# Patient Record
Sex: MALE | Race: BLACK OR AFRICAN AMERICAN | Employment: FULL TIME | URBAN - METROPOLITAN AREA
[De-identification: names, ages, dates, MRNs, and addresses within clinical notes are randomized per-mention and may not be internally consistent; named-entity substitution may affect disease eponyms.]

---

## 2019-05-26 ENCOUNTER — HOSPITAL ENCOUNTER (EMERGENCY)
Age: 34
Discharge: HOME | End: 2019-05-27
Attending: EMERGENCY MEDICINE | Admitting: EMERGENCY MEDICINE
Payer: SELF-PAY

## 2019-05-26 VITALS
HEART RATE: 87 BPM | WEIGHT: 315 LBS | DIASTOLIC BLOOD PRESSURE: 91 MMHG | HEIGHT: 71 IN | OXYGEN SATURATION: 99 % | SYSTOLIC BLOOD PRESSURE: 138 MMHG | RESPIRATION RATE: 19 BRPM | TEMPERATURE: 98.8 F | BODY MASS INDEX: 44.1 KG/M2

## 2019-05-26 DIAGNOSIS — G89.29 CHRONIC PAIN OF RIGHT ANKLE: Primary | ICD-10-CM

## 2019-05-26 DIAGNOSIS — M25.571 CHRONIC PAIN OF RIGHT ANKLE: Primary | ICD-10-CM

## 2019-05-26 PROCEDURE — 99282 EMERGENCY DEPT VISIT SF MDM: CPT

## 2019-05-27 ENCOUNTER — APPOINTMENT (OUTPATIENT)
Dept: GENERAL RADIOLOGY | Age: 34
End: 2019-05-27
Attending: EMERGENCY MEDICINE
Payer: SELF-PAY

## 2019-05-27 PROCEDURE — 73610 X-RAY EXAM OF ANKLE: CPT

## 2019-05-27 NOTE — DISCHARGE INSTRUCTIONS

## 2019-05-27 NOTE — ED PROVIDER NOTES
30 yo AAM with no relevant PMHx presents with several months intermittent right ankle pain. Pt states that a few days ago he started having another episode of right ankle pain and swelling after work. Pt states that pain tends to be worse with walking and sometimes sends very sharp pain up leg, which causes his legs to buckle. Pt states that pain is better now than it has been in a few days. No injury, no fevers, no other symptoms or complaints. History reviewed. No pertinent past medical history. History reviewed. No pertinent surgical history. History reviewed. No pertinent family history.     Social History     Socioeconomic History    Marital status: SINGLE     Spouse name: Not on file    Number of children: Not on file    Years of education: Not on file    Highest education level: Not on file   Occupational History    Not on file   Social Needs    Financial resource strain: Not on file    Food insecurity:     Worry: Not on file     Inability: Not on file    Transportation needs:     Medical: Not on file     Non-medical: Not on file   Tobacco Use    Smoking status: Never Smoker    Smokeless tobacco: Never Used   Substance and Sexual Activity    Alcohol use: Never     Frequency: Never    Drug use: Never    Sexual activity: Not on file   Lifestyle    Physical activity:     Days per week: Not on file     Minutes per session: Not on file    Stress: Not on file   Relationships    Social connections:     Talks on phone: Not on file     Gets together: Not on file     Attends Yazdanism service: Not on file     Active member of club or organization: Not on file     Attends meetings of clubs or organizations: Not on file     Relationship status: Not on file    Intimate partner violence:     Fear of current or ex partner: Not on file     Emotionally abused: Not on file     Physically abused: Not on file     Forced sexual activity: Not on file   Other Topics Concern    Not on file Social History Narrative    Not on file         ALLERGIES: Patient has no known allergies. Review of Systems   Constitutional: Negative for fever. HENT: Negative for trouble swallowing. Respiratory: Negative for shortness of breath. Cardiovascular: Negative for chest pain. Gastrointestinal: Negative for abdominal pain and vomiting. Genitourinary: Negative for difficulty urinating. Musculoskeletal: Positive for arthralgias. Skin: Negative for wound. Neurological: Negative for syncope. Psychiatric/Behavioral: Negative for behavioral problems. All other systems reviewed and are negative. Vitals:    05/26/19 2348   BP: (!) 138/91   Pulse: 87   Resp: 19   Temp: 98.8 °F (37.1 °C)   SpO2: 99%   Weight: 147.4 kg (325 lb)   Height: 5' 11\" (1.803 m)            Physical Exam   Constitutional: He is oriented to person, place, and time. He appears well-developed. No distress. well-appearing, nad   HENT:   Head: Normocephalic and atraumatic. Eyes: EOM are normal.   Neck: Normal range of motion. Cardiovascular: Normal rate and intact distal pulses. Pulmonary/Chest: Effort normal and breath sounds normal. No respiratory distress. Abdominal: Soft. There is no tenderness. Musculoskeletal: Normal range of motion. He exhibits no edema or tenderness. No bony tenderness, mechanically stable, pt ambulates with slight limp   Neurological: He is alert and oriented to person, place, and time. No focal deficits noted   Skin: Skin is warm. Psychiatric: His behavior is normal.   Nursing note and vitals reviewed. MDM  Number of Diagnoses or Management Options  Chronic pain of right ankle:   Diagnosis management comments: 30 yo AAM with no relevant PMHx presents with several months intermittent right ankle pain. No injury, no fevers, no other symptoms or complaints. Examination unremarkable with no bony tenderness, mechanically stable, ambulates without much difficulty in ED.   Xray no acute process to myr read. I do not suspect emergent medical condition clinically. Dc home, symptom management, follow-up, return precautions. Amount and/or Complexity of Data Reviewed  Tests in the radiology section of CPT®: ordered and reviewed  Review and summarize past medical records: yes  Independent visualization of images, tracings, or specimens: yes    Patient Progress  Patient progress: stable         Procedures    PROGRESS NOTES    3:36 AM:   Kerri Naranjo MD arrives to the bedside to evaluate the patient. Answered the patient's questions regarding the treatment plan. CONSULTATIONS  None      MEDICATIONS ORDERED  Medications - No data to display    RADIOLOGY INTERPRETATIONS  XR ANKLE RT MIN 3 V    (Results Pending)       EKG READINGS/LABORATORY RESULTS  No results found for this or any previous visit (from the past 12 hour(s)). ED DIAGNOSIS & DISPOSITION INFORMATION  Diagnosis:   1.  Chronic pain of right ankle        Disposition: Discharged    Follow-up Information     Follow up With Specialties Details Why [id number] [facility]  Schedule an appointment as soon as possible for a visit  [address] [facility]  [address] [address] [address] ([facility]) [address] EMERGENCY DEPT Emergency Medicine  As needed [address]  [phone]          Patient's Medications    No medications on file           Kerri Naranjo MD.

## 2019-05-27 NOTE — ED TRIAGE NOTES
Pt ambulatory into triage for c/o right ankle pain x5 months, denies any injury. Pt states \"I was out of work for a while and when I went back to work my ankle started bothering me again\". PMSx3 in right foot. No swelling noted.

## 2019-05-27 NOTE — ED NOTES
3:53 AM  05/27/19     Discharge instructions given to patient (name) with verbalization of understanding. Patient accompanied by self. Patient discharged with the following prescriptions none. Patient discharged to home (destination).       Jeff Agrawal RN

## 2020-10-26 ENCOUNTER — OFFICE VISIT (OUTPATIENT)
Dept: FAMILY MEDICINE CLINIC | Facility: CLINIC | Age: 35
End: 2020-10-26

## 2020-10-26 VITALS
RESPIRATION RATE: 16 BRPM | SYSTOLIC BLOOD PRESSURE: 153 MMHG | HEART RATE: 88 BPM | WEIGHT: 315 LBS | BODY MASS INDEX: 42.66 KG/M2 | HEIGHT: 72 IN | OXYGEN SATURATION: 96 % | TEMPERATURE: 97 F | DIASTOLIC BLOOD PRESSURE: 96 MMHG

## 2020-10-26 DIAGNOSIS — E66.01 OBESITY, MORBID (HCC): ICD-10-CM

## 2020-10-26 DIAGNOSIS — I10 ESSENTIAL HYPERTENSION: ICD-10-CM

## 2020-10-26 DIAGNOSIS — R20.0 NUMBNESS: Primary | ICD-10-CM

## 2020-10-26 DIAGNOSIS — K62.5 BRBPR (BRIGHT RED BLOOD PER RECTUM): ICD-10-CM

## 2020-10-26 PROCEDURE — 99202 OFFICE O/P NEW SF 15 MIN: CPT | Performed by: FAMILY MEDICINE

## 2020-10-26 RX ORDER — AMLODIPINE BESYLATE 5 MG/1
5 TABLET ORAL DAILY
COMMUNITY
Start: 2020-07-31 | End: 2020-10-26 | Stop reason: SDUPTHER

## 2020-10-26 RX ORDER — AMLODIPINE BESYLATE 5 MG/1
5 TABLET ORAL DAILY
Qty: 30 TAB | Refills: 5 | Status: SHIPPED | OUTPATIENT
Start: 2020-10-26 | End: 2021-10-19

## 2020-10-26 NOTE — PROGRESS NOTES
HPI  Saritha Woods is a 36 y.o. male being seen today for   Chief Complaint   Patient presents with    Medication Refill   IOV for this pt to care a Dam[facility] Wynn.  he states that he has high blood pressure and is out of bp med. He was diagnosed in the ED a few months ago and was on amlodipine breifly. No side effect from this med but he ran out. A few weeks ago he had brbpr. Resolved on its own. No pain. He lucio report episodic numbness to various parts of his body. Some times his arm or leg, sometimes his head. He cannot tell if it is due to positioning. Not getting worse. Was not better or worse when he was on amlodipine. No past medical history on file. ROS  Patient states that he is feeling well. Denies complaints of chest pain, shortness of breath, swelling of legs, dizziness or weakness. he denies nausea, vomiting or diarrhea. Current Outpatient Medications   Medication Sig    amLODIPine (NORVASC) 5 mg tablet Take 1 Tab by mouth daily. No current facility-administered medications for this visit. PE  Visit Vitals  BP (!) 153/96 (BP 1 Location: Left arm, BP Patient Position: Sitting)   Pulse 88   Temp 97 °F (36.1 °C) (Temporal)   Resp 16   Ht 6' (1.829 m)   Wt 315 lb (142.9 kg)   SpO2 96%   BMI 42.72 kg/m²        Alert and oriented with normal mood and affect. he is well developed and well nourished . Lungs are clear without wheezing. Heart rate is regular without murmurs or gallops. There is no lower extremity edema. Peripheral pulses are full. Assessment and Plan:        ICD-10-CM ICD-9-CM    1. Numbness   Unclear cause. Does not seem c/w circulatory issue or pinched nerve. If persists consider neuro referral R20.0 782.0    2. Obesity, morbid ([facility] [address]   reveiwed wt loss plan. Pt would like to lose over 50#. Recheck in a few months E66.01 278.01    3. Essential hypertension   Restart amlodipine  Follow up in a few months I10 401.9    4.  BRBPR (bright red blood per rectum)  Monitor sx and recheck next visit.   K62.5 569.3      Restart amlodpine  revieweed wt loss plan  Follow up 2 mos      Saida Keith MD

## 2020-10-27 PROBLEM — I10 ESSENTIAL HYPERTENSION: Status: ACTIVE | Noted: 2020-10-27

## 2021-10-19 RX ORDER — AMLODIPINE BESYLATE 5 MG/1
TABLET ORAL
Qty: 30 TABLET | Refills: 0 | Status: SHIPPED | OUTPATIENT
Start: 2021-10-19 | End: 2022-01-24 | Stop reason: DRUGHIGH

## 2022-01-24 ENCOUNTER — OFFICE VISIT (OUTPATIENT)
Dept: FAMILY MEDICINE CLINIC | Facility: CLINIC | Age: 37
End: 2022-01-24

## 2022-01-24 VITALS
BODY MASS INDEX: 42.72 KG/M2 | HEART RATE: 84 BPM | SYSTOLIC BLOOD PRESSURE: 151 MMHG | RESPIRATION RATE: 18 BRPM | OXYGEN SATURATION: 92 % | TEMPERATURE: 96.2 F | DIASTOLIC BLOOD PRESSURE: 87 MMHG | HEIGHT: 72 IN

## 2022-01-24 DIAGNOSIS — F17.200 SMOKER: ICD-10-CM

## 2022-01-24 DIAGNOSIS — I10 ESSENTIAL HYPERTENSION: Primary | ICD-10-CM

## 2022-01-24 PROCEDURE — 99442 PR PHYS/QHP TELEPHONE EVALUATION 11-20 MIN: CPT | Performed by: FAMILY MEDICINE

## 2022-01-24 RX ORDER — AMLODIPINE BESYLATE 10 MG/1
10 TABLET ORAL DAILY
Qty: 90 TABLET | Refills: 1 | Status: SHIPPED | OUTPATIENT
Start: 2022-01-24 | End: 2022-05-19 | Stop reason: SDUPTHER

## 2022-01-24 RX ORDER — BUPROPION HYDROCHLORIDE 150 MG/1
150 TABLET, EXTENDED RELEASE ORAL 2 TIMES DAILY
Qty: 60 TABLET | Refills: 2 | Status: SHIPPED | OUTPATIENT
Start: 2022-01-24

## 2022-01-24 NOTE — PROGRESS NOTES
Patient not seen  In person due to positive COVID test on this past Saturday 1/22/22 . Telemedicine visit done instead. Will mail AVS to patient.          AVS mailed to patient

## 2022-01-26 NOTE — PROGRESS NOTES
HPI  Bryan Porter is a 29 y.o. male being seen today for   Chief Complaint   Patient presents with    Hypertension   follow up for this pt with hypertension and smoking. he states that he was recently dx with covid 19 but is feeling much better. He is still within his quarantine period so that after his vitals were taken, he actually went back to his car and remainder of visit was done via telephone due to covid precautions. He does check his bp at home. He is out of bp meds a few days. Before he ran out his bp was 140s and 150s. He is interested in increasing his dose of amlodpine    He was still smoking until he got covid. Now he has not smoked for a week. He is interested in quitting for good and feels this time is as good as any. Wanting to know about quit aids. Past Medical History:   Diagnosis Date    Hypertension          ROS  Patient states that he is feeling well. Denies complaints of chest pain, shortness of breath, swelling of legs, dizziness or weakness. he denies nausea, vomiting or diarrhea. Current Outpatient Medications   Medication Sig    amLODIPine (NORVASC) 10 mg tablet Take 1 Tablet by mouth daily.  buPROPion SR (WELLBUTRIN SR) 150 mg SR tablet Take 1 Tablet by mouth two (2) times a day. No current facility-administered medications for this visit. PE  Visit Vitals  BP (!) 151/87 (BP 1 Location: Left arm, BP Patient Position: Sitting, BP Cuff Size: Large adult long)   Pulse 84   Temp (!) 96.2 °F (35.7 °C) (Temporal)   Resp 18   Ht 6' (1.829 m)   SpO2 92%   BMI 42.72 kg/m²        Alert and oriented with normal mood and affect. Assessment and Plan:        ICD-10-CM ICD-9-CM    1. Essential hypertension  I10 401.9    2. Smoker  F17.200 305.1      Increase amlodipine to 10mg daily. Refill sent  Reviewed the available quit aids. Chantix, nicotine patches, wellbutrin. He would like to start wellbutrin. Reviewed quit plan.    Follow up in a month or so.      Piter Bergeron MD    Telephone call 15 minutes    Pursuant to the emergency declaration under the [facility] waiver authority and the [employer] Act, this Virtual  Visit was conducted, with patient's consent, to reduce the patient's risk of exposure to COVID-19 and provide continuity of care for an established patient.

## 2022-02-28 ENCOUNTER — TELEPHONE (OUTPATIENT)
Dept: FAMILY MEDICINE CLINIC | Facility: CLINIC | Age: 37
End: 2022-02-28

## 2022-02-28 NOTE — TELEPHONE ENCOUNTER
Attempted to reach out to patient on his mobile phone. Left patient a voicemail to give the office a call back.

## 2022-02-28 NOTE — TELEPHONE ENCOUNTER
----- Message from Trey Catherine MD sent at 2/27/2022 12:42 PM EST -----  Regarding: follow up  Can you see if this nice pt will see piotr on Wednesday?    For bp follow up  Im trying to get some patients scheduled for her    Thx  jenna

## 2022-03-18 PROBLEM — I10 ESSENTIAL HYPERTENSION: Status: ACTIVE | Noted: 2020-10-27

## 2022-03-19 PROBLEM — E66.01 OBESITY, MORBID (HCC): Status: ACTIVE | Noted: 2020-10-26

## 2022-05-19 RX ORDER — AMLODIPINE BESYLATE 10 MG/1
10 TABLET ORAL DAILY
Qty: 90 TABLET | Refills: 1 | Status: SHIPPED | OUTPATIENT
Start: 2022-05-19

## 2022-05-23 ENCOUNTER — HOSPITAL ENCOUNTER (OUTPATIENT)
Dept: LAB | Age: 37
Discharge: HOME | End: 2022-05-23

## 2022-05-23 ENCOUNTER — OFFICE VISIT (OUTPATIENT)
Dept: FAMILY MEDICINE CLINIC | Facility: CLINIC | Age: 37
End: 2022-05-23

## 2022-05-23 VITALS
SYSTOLIC BLOOD PRESSURE: 138 MMHG | HEIGHT: 72 IN | DIASTOLIC BLOOD PRESSURE: 85 MMHG | BODY MASS INDEX: 42.66 KG/M2 | OXYGEN SATURATION: 98 % | TEMPERATURE: 97.3 F | WEIGHT: 315 LBS | RESPIRATION RATE: 18 BRPM | HEART RATE: 65 BPM

## 2022-05-23 DIAGNOSIS — I10 ESSENTIAL HYPERTENSION: ICD-10-CM

## 2022-05-23 DIAGNOSIS — Z00.00 ROUTINE GENERAL MEDICAL EXAMINATION AT A HEALTH CARE FACILITY: ICD-10-CM

## 2022-05-23 DIAGNOSIS — F17.200 SMOKER: ICD-10-CM

## 2022-05-23 DIAGNOSIS — Z00.00 ROUTINE GENERAL MEDICAL EXAMINATION AT A HEALTH CARE FACILITY: Primary | ICD-10-CM

## 2022-05-23 LAB
ALBUMIN SERPL-MCNC: 3.8 G/DL (ref 3.4–5)
ALBUMIN/GLOB SERPL: 1.1 {RATIO} (ref 0.8–1.7)
ALP SERPL-CCNC: 51 U/L (ref 45–117)
ALT SERPL-CCNC: 20 U/L (ref 16–61)
ANION GAP SERPL CALC-SCNC: 4 MMOL/L (ref 3–18)
AST SERPL-CCNC: 18 U/L (ref 10–38)
BASOPHILS # BLD: 0 K/UL (ref 0–0.1)
BASOPHILS NFR BLD: 1 % (ref 0–2)
BILIRUB SERPL-MCNC: 0.4 MG/DL (ref 0.2–1)
BUN SERPL-MCNC: 10 MG/DL (ref 7–18)
BUN/CREAT SERPL: 9 (ref 12–20)
CALCIUM SERPL-MCNC: 9.3 MG/DL (ref 8.5–10.1)
CHLORIDE SERPL-SCNC: 107 MMOL/L (ref 100–111)
CHOLEST SERPL-MCNC: 188 MG/DL
CO2 SERPL-SCNC: 28 MMOL/L (ref 21–32)
CREAT SERPL-MCNC: 1.09 MG/DL (ref 0.6–1.3)
DIFFERENTIAL METHOD BLD: ABNORMAL
EOSINOPHIL # BLD: 0.2 K/UL (ref 0–0.4)
EOSINOPHIL NFR BLD: 2 % (ref 0–5)
ERYTHROCYTE [DISTWIDTH] IN BLOOD BY AUTOMATED COUNT: 12 % (ref 11.6–14.5)
EST. AVERAGE GLUCOSE BLD GHB EST-MCNC: 100 MG/DL
GLOBULIN SER CALC-MCNC: 3.6 G/DL (ref 2–4)
GLUCOSE SERPL-MCNC: 97 MG/DL (ref 74–99)
HBA1C MFR BLD: 5.1 % (ref 4.2–5.6)
HCT VFR BLD AUTO: 39.7 % (ref 36–48)
HDLC SERPL-MCNC: 44 MG/DL (ref 40–60)
HDLC SERPL: 4.3 {RATIO} (ref 0–5)
HGB BLD-MCNC: 13 G/DL (ref 13–16)
IMM GRANULOCYTES # BLD AUTO: 0.1 K/UL (ref 0–0.04)
IMM GRANULOCYTES NFR BLD AUTO: 1 % (ref 0–0.5)
LDLC SERPL CALC-MCNC: 126.8 MG/DL (ref 0–100)
LIPID PROFILE,FLP: ABNORMAL
LYMPHOCYTES # BLD: 2.6 K/UL (ref 0.9–3.6)
LYMPHOCYTES NFR BLD: 39 % (ref 21–52)
MCH RBC QN AUTO: 28.4 PG (ref 24–34)
MCHC RBC AUTO-ENTMCNC: 32.7 G/DL (ref 31–37)
MCV RBC AUTO: 86.7 FL (ref 78–100)
MONOCYTES # BLD: 0.5 K/UL (ref 0.05–1.2)
MONOCYTES NFR BLD: 7 % (ref 3–10)
NEUTS SEG # BLD: 3.3 K/UL (ref 1.8–8)
NEUTS SEG NFR BLD: 50 % (ref 40–73)
NRBC # BLD: 0 K/UL (ref 0–0.01)
NRBC BLD-RTO: 0 PER 100 WBC
PLATELET # BLD AUTO: 255 K/UL (ref 135–420)
PMV BLD AUTO: 11.4 FL (ref 9.2–11.8)
POTASSIUM SERPL-SCNC: 4.4 MMOL/L (ref 3.5–5.5)
PROT SERPL-MCNC: 7.4 G/DL (ref 6.4–8.2)
RBC # BLD AUTO: 4.58 M/UL (ref 4.35–5.65)
SODIUM SERPL-SCNC: 139 MMOL/L (ref 136–145)
TRIGL SERPL-MCNC: 86 MG/DL (ref ?–150)
TSH SERPL DL<=0.05 MIU/L-ACNC: 1.5 UIU/ML (ref 0.36–3.74)
VLDLC SERPL CALC-MCNC: 17.2 MG/DL
WBC # BLD AUTO: 6.6 K/UL (ref 4.6–13.2)

## 2022-05-23 PROCEDURE — 80053 COMPREHEN METABOLIC PANEL: CPT

## 2022-05-23 PROCEDURE — 84443 ASSAY THYROID STIM HORMONE: CPT

## 2022-05-23 PROCEDURE — 85025 COMPLETE CBC W/AUTO DIFF WBC: CPT

## 2022-05-23 PROCEDURE — 99213 OFFICE O/P EST LOW 20 MIN: CPT | Performed by: FAMILY MEDICINE

## 2022-05-23 PROCEDURE — 83036 HEMOGLOBIN GLYCOSYLATED A1C: CPT

## 2022-05-23 PROCEDURE — 80061 LIPID PANEL: CPT

## 2022-05-23 RX ORDER — FLUTICASONE PROPIONATE 50 MCG
1 SPRAY, SUSPENSION (ML) NASAL DAILY
Qty: 1 EACH | Refills: 0 | Status: SHIPPED | COMMUNITY
Start: 2022-05-23

## 2022-05-23 NOTE — PATIENT INSTRUCTIONS
Learning About Benefits From Quitting Smoking  How does quitting smoking make you healthier? If you're thinking about quitting smoking, you may have a few reasons to be smoke-free. Your health may be one of them. · When you quit smoking, you lower your risks for cancer, lung disease, heart attack, stroke, blood vessel disease, and blindness from macular degeneration. · When you're smoke-free, you get sick less often, and you heal faster. You are less likely to get colds, flu, bronchitis, and pneumonia. · As a nonsmoker, you may find that your mood is better and you are less stressed. When and how will you feel healthier? Quitting has real health benefits that start from day 1 of being smoke-free. And the longer you stay smoke-free, the healthier you get and the better you feel. The first hours  · After just 20 minutes, your blood pressure and heart rate go down. That means there's less stress on your heart and blood vessels. · Within 12 hours, the level of carbon monoxide in your blood drops back to normal. That makes room for more oxygen. With more oxygen in your body, you may notice that you have more energy than when you smoked. After 2 weeks  · Your lungs start to work better. · Your risk of heart attack starts to drop. After 1 month  · When your lungs are clear, you cough less and breathe deeper, so it's easier to be active. · Your sense of taste and smell return. That means you can enjoy food more than you have since you started smoking. Over the years  · Over the years, your risks of heart disease, heart attack, and stroke are lower. · After 10 years, your risk of dying from lung cancer is cut by about half. And your risk for many other types of cancer is lower too. How would quitting help others in your life? When you quit smoking, you improve the health of everyone who now breathes in your smoke. · Their heart, lung, and cancer risks drop, much like yours. · They are sick less.  For babies and small children, living smoke-free means they're less likely to have ear infections, pneumonia, and bronchitis. · If you're a woman who is or will be pregnant someday, quitting smoking means a healthier . · Children who are close to you are less likely to become adult smokers. Where can you learn more? Go to http://www.gray.com/  Enter O319 in the search box to learn more about \"Learning About Benefits From Quitting Smoking. \"  Current as of: 2021               Content Version: 13.2  © 5143-6088 [employer]wise, [employer]. Care instructions adapted under license by [employer] (which disclaims liability or warranty for this information). If you have questions about a medical condition or this instruction, always ask your healthcare professional. [address] any warranty or liability for your use of this information.

## 2022-05-23 NOTE — PROGRESS NOTES
Patient presents for lab draw ordered by:    Ordering Provider:  Leander Gaming  Ordering Department/Practice: [facility]  Phone:  [phone]  Date Ordered:  5/23/2022    The following labs were drawn and sent to [address] by Zeke Whalen LPN:    CBC, Lipid Profile, CMP, TSH, 3rd Generation and HgA1C    The following tubes were sent:    Gold  ( 2) and Lavender  ( 1)    Draw site left brachial.  Patient tolerated draw with no distress. Sample of Fluticasone given to the patient     EKG preformed     Discharge instructions reviewed with patient    Medication list and understanding of medications reviewed with patient. OTC and herbal medications reviewed and added to med list if applicable  Barriers to adherence assessed. Guidance given regarding new medications this visit, including reason for taking this medicine, and common side effects. AVS given to patient. Explained to patient. Patient expressed understanding.

## 2022-05-23 NOTE — PROGRESS NOTES
HPI  Tico Partida is a 29 y.o. male being seen today for   Chief Complaint   Patient presents with    Other     \"pt stated when he lay down he have head pain along with numbness\"    Palpitations     \"pt stated that this happens when he's laying down\"    Ear Fullness     \"pt stated that this been going on for about 3 months\"    Medication Refill   . he states that he is actually out of blood pressure medicine. He has not been checking his pressures. Pleased it is good today    He had ear pain a few years ago and never figured out what is was. Seems to be back and he notices a pressure in right ear especially in certain positions. Sharp chest pains that come and go. They only last a few seconds. Some arm and leg numbness and he worries about his heart. He did have an echocardiogram in 2020 that was normal.   More dyspnea with exertion in the last year. He has gained weight. wellbutrin did not help stop smoking. He tried it but he was not really motivated and thinks that is why he failed. Past Medical History:   Diagnosis Date    Hypertension           ROS  Patient states that he is feeling well. Denies complaints of chest pain, shortness of breath, swelling of legs, dizziness or weakness. he denies nausea, vomiting or diarrhea. Current Outpatient Medications   Medication Sig    amLODIPine (NORVASC) 10 mg tablet Take 1 Tablet by mouth daily.  buPROPion SR (WELLBUTRIN SR) 150 mg SR tablet Take 1 Tablet by mouth two (2) times a day. (Patient not taking: Reported on 5/23/2022)     No current facility-administered medications for this visit. PE  Visit Vitals  /85 (BP 1 Location: Left upper arm, BP Patient Position: Sitting, BP Cuff Size: Large adult long)   Pulse 65   Temp 97.3 °F (36.3 °C) (Temporal)   Resp 18   Ht 6' (1.829 m)   Wt (!) 361 lb (163.7 kg)   SpO2 98%   BMI 48.96 kg/m²        Alert and oriented with normal mood and affect.  he is well developed and well nourished . Lungs are clear without wheezing. Heart rate is regular without murmurs or gallops. There is no lower extremity edema. Bilateral TM retracted without fluid or erythema. Assessment and Plan:        ICD-10-CM ICD-9-CM    1. Routine general medical examination at a health care facility  Z00.00 V70.0 LIPID PANEL      HEMOGLOBIN A1C WITH EAG      TSH 3RD GENERATION      METABOLIC PANEL, COMPREHENSIVE      CBC WITH AUTOMATED DIFF   2. Essential hypertension  I10 401.9    3. Smoker  F17.200 305.1      EKG reassuring  Reviewed with pt that his chest pain is not consistent with CAD although he has risk factors and should definitely be alert for cardiac symptoms. For any worsening or new symptoms he will call or go for evaluation. If he really wants to cut his cardiac risk he should quit smoking. He thinks he can cut back. Wellness labs  Work on [employer]  Restart bp med  Follow up in about 2 mos to see how he is doing.      flonase for ear pressure    Deonte Morales MD

## 2022-11-12 RX ORDER — AMLODIPINE BESYLATE 10 MG/1
TABLET ORAL
Qty: 90 TABLET | Refills: 0 | Status: SHIPPED | OUTPATIENT
Start: 2022-11-12

## 2023-03-03 RX ORDER — AMLODIPINE BESYLATE 10 MG/1
TABLET ORAL
Qty: 90 TABLET | Refills: 0 | OUTPATIENT
Start: 2023-03-03

## 2023-04-17 NOTE — TELEPHONE ENCOUNTER
Attempted to reach out to patient on no show appointment from 3/27/23. Left patient a voicemail stating to return the office phone call. Follow up letter mailed out to patient.

## 2023-08-14 RX ORDER — AMLODIPINE BESYLATE 10 MG/1
10 TABLET ORAL DAILY
Qty: 30 TABLET | Refills: 0 | Status: SHIPPED | OUTPATIENT
Start: 2023-08-14

## 2023-08-14 RX ORDER — AMLODIPINE BESYLATE 10 MG/1
TABLET ORAL
Qty: 30 TABLET | Refills: 0 | OUTPATIENT
Start: 2023-08-14

## 2023-08-14 NOTE — TELEPHONE ENCOUNTER
LOV: 5/23/22    Attempted to reach out to patient to schedule an follow up visit. Left patient a message stating to return the office phone call.

## 2023-10-23 RX ORDER — AMLODIPINE BESYLATE 10 MG/1
10 TABLET ORAL DAILY
Qty: 30 TABLET | Refills: 0 | OUTPATIENT
Start: 2023-10-23

## 2023-10-24 NOTE — TELEPHONE ENCOUNTER
Reached out to patient to schedule an follow up visit. Patient stated he was sleep and trying to figure some things out and he will give me a call back.

## 2023-12-06 RX ORDER — AMLODIPINE BESYLATE 10 MG/1
10 TABLET ORAL DAILY
Qty: 30 TABLET | Refills: 0 | Status: SHIPPED | OUTPATIENT
Start: 2023-12-06

## 2024-05-10 NOTE — TELEPHONE ENCOUNTER
Patient has insurance since this past August / September. No showed appointment with [facility] 1/2024

## 2024-05-29 RX ORDER — AMLODIPINE BESYLATE 10 MG/1
10 TABLET ORAL DAILY
Qty: 30 TABLET | Refills: 0 | OUTPATIENT
Start: 2024-05-29

## 2024-08-13 NOTE — TELEPHONE ENCOUNTER
LOV: 5/23/2022    Patient walked in today to be seen but informed patient we can't see him due to having insurance. Patient stated he tried reaching out to other doctors office but they are booked out. Advised patient to reach and schedule with a new PCP. Patient expressed understanding.

## 2024-08-14 RX ORDER — AMLODIPINE BESYLATE 10 MG/1
10 TABLET ORAL DAILY
Qty: 30 TABLET | Refills: 0 | Status: SHIPPED | OUTPATIENT
Start: 2024-08-14